# Patient Record
Sex: MALE | Race: WHITE
[De-identification: names, ages, dates, MRNs, and addresses within clinical notes are randomized per-mention and may not be internally consistent; named-entity substitution may affect disease eponyms.]

---

## 2018-07-12 NOTE — CT
CT  HEAD WITHOUT CONTRAST:

7/12/18

 

COMPARISON:  

None. 

 

HISTORY:

Fall, trauma, pain.

 

TECHNIQUE:  

Serial axial CT imaging is obtained at 5 mm intervals from vertex through skull base without contrast
. 

 

FINDINGS:  

The bilateral central incisors of the maxilla are absent, possibly secondary to trauma. There is a fr
acture involving the anterior most aspect of the maxilla in this region with associated soft tissue s
welling and subcutaneous gas in the perinasal region and anterior to the maxilla on the left. Bilater
al nasal bone fractures are suspected as well. 

 

No displaced calvarial fracture. No intracranial hemorrhage, midline shift or mass effect. 

 

IMPRESSION:  

Findings suggesting bilateral nasal bone fractures as well as an anterior maxillary fracture with abs
ence of bilateral maxillary central incisors. 

 

POS: JOHN

## 2018-07-13 NOTE — CT
FACIAL BONE CT 

7/12/18

 

COMPARISON:  

None.

 

HISTORY: 

Fall, trauma, pain. 

 

TECHNIQUE:  

Serial axial CT imaging at 2.5 mm intervals through the facial bones without contrast. Coronal and sa
gittal reformatted imaging obtained. 

 

FINDINGS:  

The nasal bones, soft tissues of the nose, and the upper and lower lip are not fully imaged on this e
xamination. 

 

Bilateral mildly displaced nasal bone fractures are noted, right more prominent than left. There is p
erinasal soft tissue swelling, not fully imaged on this exam. There is soft tissue swelling anterior 
to the maxillary sinus on the right medially. There is an obliquely oriented fracture involving the a
nterior superior aspect of the maxilla extending through the region of the right and left maxillary c
entral incisors, both of which are absent. There are punctate incompletely imaged calcific densities 
in the region of the lower lip which may represent foreign bodies or fracture fragments. 

 

Neither temporomandibular joint appears dislocated. No discrete mandibular fracture is noted. 

 

On the lateral examination, the maxillary fracture anteriorly and superiorly is obliquely oriented an
d the anterior superior fracture fragment is displaced superiorly extending into the midline base of 
the nasal cavity slightly. 

 

The zygomatic arches and the pterygoid plates are intact. The orbital floor and the medial orbital wa
ll appears intact bilaterally. 

 

There appears to be a comminuted and slightly displaced/impacted osseous nasal septal fracture as wel
l. 

 

IMPRESSION:  

Incomplete visualization of the soft tissues of the nose as well as the nasal bones. Bilateral nasal 
bone fractures are noted as well as fracture of the osseous nasal septum. There is also an obliquely 
oriented fracture involving the anterior superior aspect of the maxilla with superior displacement ex
tending through the base of the left and right maxillary central incisors, both of these teeth absenc
e, likely posttraumatic in nature. There are incompletely imaged calcific densities associated with t
he lower lip as well. 

 

POS: C[facility]Y

## 2018-07-13 NOTE — CT
CERVICAL SPINE CT WITHOUT CONTRAST:

7/12/18

 

COMPARISON:  

None.

 

HISTORY: 

Fall, trauma, pain.

 

TECHNIQUE:  

Serial axial CT imaging at 2.5 mm intervals from the lung apices through the skull base without contr
ast. Coronal and sagittal reformatted imaging obtained. 

 

FINDINGS:  

The imaged paranasal sinuses/mastoid air cells are well aerated. 

 

The C1 ring is intact. The occipital condyles, the dens, and the C1-2 articulation appears within nor
mal limits. 

 

The craniocervical junction and the atlantoaxial interspace appears within normal limits as does the 
cervicothoracic junction. Cervical vertebral body height and alignment is normal. There is no prevert
ebral soft tissue swelling. 

 

No displaced fracture or evidence of dislocation. Imaged lung apices demonstrate no acute findings. 

 

IMPRESSION:  

No acute fracture or evidence of dislocation. 

 

POS: JOHN

## 2019-04-12 ENCOUNTER — HOSPITAL ENCOUNTER (EMERGENCY)
Dept: HOSPITAL 9 - MADERS | Age: 19
Discharge: HOME | End: 2019-04-12
Payer: SELF-PAY

## 2019-04-12 DIAGNOSIS — K04.7: Primary | ICD-10-CM

## 2019-04-12 DIAGNOSIS — F17.210: ICD-10-CM

## 2019-04-12 PROCEDURE — 99282 EMERGENCY DEPT VISIT SF MDM: CPT
